# Patient Record
Sex: FEMALE | Race: WHITE | URBAN - METROPOLITAN AREA
[De-identification: names, ages, dates, MRNs, and addresses within clinical notes are randomized per-mention and may not be internally consistent; named-entity substitution may affect disease eponyms.]

---

## 2021-05-28 ENCOUNTER — RECORDS - HEALTHEAST (OUTPATIENT)
Dept: ADMINISTRATIVE | Facility: CLINIC | Age: 46
End: 2021-05-28

## 2021-07-26 DIAGNOSIS — Z11.59 ENCOUNTER FOR SCREENING FOR OTHER VIRAL DISEASES: ICD-10-CM

## 2021-08-05 ENCOUNTER — LAB (OUTPATIENT)
Dept: LAB | Facility: CLINIC | Age: 46
End: 2021-08-05
Attending: OBSTETRICS & GYNECOLOGY
Payer: COMMERCIAL

## 2021-08-05 DIAGNOSIS — Z11.59 ENCOUNTER FOR SCREENING FOR OTHER VIRAL DISEASES: ICD-10-CM

## 2021-08-05 PROCEDURE — U0005 INFEC AGEN DETEC AMPLI PROBE: HCPCS

## 2021-08-05 PROCEDURE — U0003 INFECTIOUS AGENT DETECTION BY NUCLEIC ACID (DNA OR RNA); SEVERE ACUTE RESPIRATORY SYNDROME CORONAVIRUS 2 (SARS-COV-2) (CORONAVIRUS DISEASE [COVID-19]), AMPLIFIED PROBE TECHNIQUE, MAKING USE OF HIGH THROUGHPUT TECHNOLOGIES AS DESCRIBED BY CMS-2020-01-R: HCPCS

## 2021-08-05 RX ORDER — DIAZEPAM 5 MG
5 TABLET ORAL EVERY 6 HOURS
COMMUNITY
Start: 2021-06-01

## 2021-08-05 RX ORDER — LEVOCETIRIZINE DIHYDROCHLORIDE 5 MG/1
5 TABLET, FILM COATED ORAL DAILY
COMMUNITY
Start: 2021-07-27

## 2021-08-05 RX ORDER — LEVOTHYROXINE, LIOTHYRONINE 19; 4.5 UG/1; UG/1
TABLET ORAL
COMMUNITY
Start: 2021-08-01

## 2021-08-05 RX ORDER — LEVOTHYROXINE, LIOTHYRONINE 9.5; 2.25 UG/1; UG/1
TABLET ORAL
COMMUNITY
Start: 2021-07-25

## 2021-08-05 RX ORDER — UBROGEPANT 100 MG/1
TABLET ORAL
COMMUNITY
Start: 2021-07-22

## 2021-08-05 RX ORDER — TRIAMCINOLONE ACETONIDE 1 MG/G
CREAM TOPICAL
COMMUNITY
Start: 2019-11-14

## 2021-08-05 RX ORDER — ALBUTEROL SULFATE 90 UG/1
AEROSOL, METERED RESPIRATORY (INHALATION)
COMMUNITY
Start: 2021-07-21

## 2021-08-05 RX ORDER — BECLOMETHASONE DIPROPIONATE HFA 80 UG/1
AEROSOL, METERED RESPIRATORY (INHALATION)
COMMUNITY
Start: 2021-04-27

## 2021-08-05 ASSESSMENT — MIFFLIN-ST. JEOR: SCORE: 1296.33

## 2021-08-06 LAB — SARS-COV-2 RNA RESP QL NAA+PROBE: NEGATIVE

## 2021-08-09 ENCOUNTER — ANESTHESIA (OUTPATIENT)
Dept: SURGERY | Facility: AMBULATORY SURGERY CENTER | Age: 46
End: 2021-08-09
Payer: COMMERCIAL

## 2021-08-09 ENCOUNTER — HOSPITAL ENCOUNTER (OUTPATIENT)
Facility: AMBULATORY SURGERY CENTER | Age: 46
End: 2021-08-09
Attending: OBSTETRICS & GYNECOLOGY
Payer: COMMERCIAL

## 2021-08-09 ENCOUNTER — ANESTHESIA EVENT (OUTPATIENT)
Dept: SURGERY | Facility: AMBULATORY SURGERY CENTER | Age: 46
End: 2021-08-09
Payer: COMMERCIAL

## 2021-08-09 VITALS
HEART RATE: 111 BPM | HEIGHT: 62 IN | RESPIRATION RATE: 16 BRPM | DIASTOLIC BLOOD PRESSURE: 60 MMHG | TEMPERATURE: 97 F | OXYGEN SATURATION: 95 % | BODY MASS INDEX: 28.52 KG/M2 | WEIGHT: 155 LBS | SYSTOLIC BLOOD PRESSURE: 128 MMHG

## 2021-08-09 DIAGNOSIS — Z85.3 HISTORY OF BREAST CANCER: ICD-10-CM

## 2021-08-09 DIAGNOSIS — Z09 S/P GYNECOLOGICAL SURGERY, FOLLOW-UP EXAM: Primary | ICD-10-CM

## 2021-08-09 DIAGNOSIS — N92.0: ICD-10-CM

## 2021-08-09 LAB
HCG UR QL: NEGATIVE
HEMOGLOBIN: 13 G/DL
HEMOGLOBIN: 14.9 G/DL
INTERNAL QC OK POCT: NORMAL

## 2021-08-09 RX ORDER — PROPOFOL 10 MG/ML
INJECTION, EMULSION INTRAVENOUS CONTINUOUS PRN
Status: DISCONTINUED | OUTPATIENT
Start: 2021-08-09 | End: 2021-08-09

## 2021-08-09 RX ORDER — DEXAMETHASONE SODIUM PHOSPHATE 10 MG/ML
4 INJECTION, SOLUTION INTRAMUSCULAR; INTRAVENOUS ONCE
Status: DISCONTINUED | OUTPATIENT
Start: 2021-08-09 | End: 2021-08-10 | Stop reason: HOSPADM

## 2021-08-09 RX ORDER — DIAZEPAM 5 MG
5 TABLET ORAL 2 TIMES DAILY PRN
Qty: 15 TABLET | Refills: 0 | Status: SHIPPED | OUTPATIENT
Start: 2021-08-09 | End: 2021-08-14

## 2021-08-09 RX ORDER — IBUPROFEN 800 MG/1
800 TABLET, FILM COATED ORAL ONCE
Status: DISCONTINUED | OUTPATIENT
Start: 2021-08-09 | End: 2021-08-10 | Stop reason: HOSPADM

## 2021-08-09 RX ORDER — KETOROLAC TROMETHAMINE 15 MG/ML
15 INJECTION, SOLUTION INTRAMUSCULAR; INTRAVENOUS EVERY 6 HOURS PRN
Status: DISCONTINUED | OUTPATIENT
Start: 2021-08-09 | End: 2021-08-10 | Stop reason: HOSPADM

## 2021-08-09 RX ORDER — SODIUM CHLORIDE, SODIUM LACTATE, POTASSIUM CHLORIDE, AND CALCIUM CHLORIDE .6; .31; .03; .02 G/100ML; G/100ML; G/100ML; G/100ML
IRRIGANT IRRIGATION PRN
Status: DISCONTINUED | OUTPATIENT
Start: 2021-08-09 | End: 2021-08-09 | Stop reason: HOSPADM

## 2021-08-09 RX ORDER — ONDANSETRON 4 MG/1
4 TABLET, ORALLY DISINTEGRATING ORAL EVERY 30 MIN PRN
Status: DISCONTINUED | OUTPATIENT
Start: 2021-08-09 | End: 2021-08-10 | Stop reason: HOSPADM

## 2021-08-09 RX ORDER — FENTANYL CITRATE 50 UG/ML
INJECTION, SOLUTION INTRAMUSCULAR; INTRAVENOUS PRN
Status: DISCONTINUED | OUTPATIENT
Start: 2021-08-09 | End: 2021-08-09

## 2021-08-09 RX ORDER — LIDOCAINE HYDROCHLORIDE 20 MG/ML
INJECTION, SOLUTION INFILTRATION; PERINEURAL PRN
Status: DISCONTINUED | OUTPATIENT
Start: 2021-08-09 | End: 2021-08-09

## 2021-08-09 RX ORDER — GLYCOPYRROLATE 0.2 MG/ML
INJECTION, SOLUTION INTRAMUSCULAR; INTRAVENOUS PRN
Status: DISCONTINUED | OUTPATIENT
Start: 2021-08-09 | End: 2021-08-09

## 2021-08-09 RX ORDER — KETOROLAC TROMETHAMINE 30 MG/ML
INJECTION, SOLUTION INTRAMUSCULAR; INTRAVENOUS PRN
Status: DISCONTINUED | OUTPATIENT
Start: 2021-08-09 | End: 2021-08-09

## 2021-08-09 RX ORDER — FENTANYL CITRATE 50 UG/ML
50 INJECTION, SOLUTION INTRAMUSCULAR; INTRAVENOUS
Status: DISCONTINUED | OUTPATIENT
Start: 2021-08-09 | End: 2021-08-10 | Stop reason: HOSPADM

## 2021-08-09 RX ORDER — MEPERIDINE HYDROCHLORIDE 25 MG/ML
12.5 INJECTION INTRAMUSCULAR; INTRAVENOUS; SUBCUTANEOUS
Status: DISCONTINUED | OUTPATIENT
Start: 2021-08-09 | End: 2021-08-10 | Stop reason: HOSPADM

## 2021-08-09 RX ORDER — CEFAZOLIN SODIUM 2 G/100ML
2 INJECTION, SOLUTION INTRAVENOUS SEE ADMIN INSTRUCTIONS
Status: DISCONTINUED | OUTPATIENT
Start: 2021-08-09 | End: 2021-08-10 | Stop reason: HOSPADM

## 2021-08-09 RX ORDER — ONDANSETRON 2 MG/ML
4 INJECTION INTRAMUSCULAR; INTRAVENOUS EVERY 30 MIN PRN
Status: DISCONTINUED | OUTPATIENT
Start: 2021-08-09 | End: 2021-08-10 | Stop reason: HOSPADM

## 2021-08-09 RX ORDER — ACETAMINOPHEN 325 MG/1
650 TABLET ORAL EVERY 4 HOURS PRN
Status: DISCONTINUED | OUTPATIENT
Start: 2021-08-09 | End: 2021-08-10 | Stop reason: HOSPADM

## 2021-08-09 RX ORDER — SODIUM CHLORIDE, SODIUM LACTATE, POTASSIUM CHLORIDE, CALCIUM CHLORIDE 600; 310; 30; 20 MG/100ML; MG/100ML; MG/100ML; MG/100ML
INJECTION, SOLUTION INTRAVENOUS CONTINUOUS
Status: DISCONTINUED | OUTPATIENT
Start: 2021-08-09 | End: 2021-08-10 | Stop reason: HOSPADM

## 2021-08-09 RX ORDER — MAGNESIUM HYDROXIDE 1200 MG/15ML
LIQUID ORAL PRN
Status: DISCONTINUED | OUTPATIENT
Start: 2021-08-09 | End: 2021-08-09 | Stop reason: HOSPADM

## 2021-08-09 RX ORDER — FENTANYL CITRATE 50 UG/ML
50 INJECTION, SOLUTION INTRAMUSCULAR; INTRAVENOUS EVERY 5 MIN PRN
Status: DISCONTINUED | OUTPATIENT
Start: 2021-08-09 | End: 2021-08-10 | Stop reason: HOSPADM

## 2021-08-09 RX ORDER — MAGNESIUM SULFATE HEPTAHYDRATE 40 MG/ML
4 INJECTION, SOLUTION INTRAVENOUS ONCE
Status: COMPLETED | OUTPATIENT
Start: 2021-08-09 | End: 2021-08-09

## 2021-08-09 RX ORDER — PROPOFOL 10 MG/ML
INJECTION, EMULSION INTRAVENOUS PRN
Status: DISCONTINUED | OUTPATIENT
Start: 2021-08-09 | End: 2021-08-09

## 2021-08-09 RX ORDER — HALOPERIDOL 5 MG/ML
1 INJECTION INTRAMUSCULAR ONCE
Status: COMPLETED | OUTPATIENT
Start: 2021-08-09 | End: 2021-08-09

## 2021-08-09 RX ORDER — CEFAZOLIN SODIUM 2 G/100ML
2 INJECTION, SOLUTION INTRAVENOUS
Status: COMPLETED | OUTPATIENT
Start: 2021-08-09 | End: 2021-08-09

## 2021-08-09 RX ORDER — KETAMINE HYDROCHLORIDE 10 MG/ML
INJECTION INTRAMUSCULAR; INTRAVENOUS PRN
Status: DISCONTINUED | OUTPATIENT
Start: 2021-08-09 | End: 2021-08-09

## 2021-08-09 RX ORDER — LIDOCAINE 40 MG/G
CREAM TOPICAL
Status: DISCONTINUED | OUTPATIENT
Start: 2021-08-09 | End: 2021-08-10 | Stop reason: HOSPADM

## 2021-08-09 RX ORDER — HYDROMORPHONE HCL IN WATER/PF 6 MG/30 ML
0.4 PATIENT CONTROLLED ANALGESIA SYRINGE INTRAVENOUS EVERY 5 MIN PRN
Status: SHIPPED | OUTPATIENT
Start: 2021-08-09 | End: 2021-08-09

## 2021-08-09 RX ORDER — TRAMADOL HYDROCHLORIDE 50 MG/1
50 TABLET ORAL EVERY 6 HOURS PRN
Qty: 20 TABLET | Refills: 0 | Status: SHIPPED | OUTPATIENT
Start: 2021-08-09 | End: 2021-08-16

## 2021-08-09 RX ORDER — ALBUTEROL SULFATE 0.83 MG/ML
2.5 SOLUTION RESPIRATORY (INHALATION) EVERY 4 HOURS PRN
Status: DISCONTINUED | OUTPATIENT
Start: 2021-08-09 | End: 2021-08-10 | Stop reason: HOSPADM

## 2021-08-09 RX ORDER — LIDOCAINE HYDROCHLORIDE 10 MG/ML
INJECTION, SOLUTION EPIDURAL; INFILTRATION; INTRACAUDAL; PERINEURAL PRN
Status: DISCONTINUED | OUTPATIENT
Start: 2021-08-09 | End: 2021-08-09 | Stop reason: HOSPADM

## 2021-08-09 RX ORDER — OXYCODONE HYDROCHLORIDE 5 MG/1
5 TABLET ORAL EVERY 4 HOURS PRN
Status: DISCONTINUED | OUTPATIENT
Start: 2021-08-09 | End: 2021-08-10 | Stop reason: HOSPADM

## 2021-08-09 RX ORDER — DEXAMETHASONE SODIUM PHOSPHATE 10 MG/ML
INJECTION, SOLUTION INTRAMUSCULAR; INTRAVENOUS PRN
Status: DISCONTINUED | OUTPATIENT
Start: 2021-08-09 | End: 2021-08-09

## 2021-08-09 RX ADMIN — DEXAMETHASONE SODIUM PHOSPHATE 10 MG: 10 INJECTION, SOLUTION INTRAMUSCULAR; INTRAVENOUS at 12:13

## 2021-08-09 RX ADMIN — SODIUM CHLORIDE, SODIUM LACTATE, POTASSIUM CHLORIDE, CALCIUM CHLORIDE: 600; 310; 30; 20 INJECTION, SOLUTION INTRAVENOUS at 14:22

## 2021-08-09 RX ADMIN — HALOPERIDOL 1 MG: 5 INJECTION INTRAMUSCULAR at 16:24

## 2021-08-09 RX ADMIN — FENTANYL CITRATE 50 MCG: 50 INJECTION, SOLUTION INTRAMUSCULAR; INTRAVENOUS at 12:30

## 2021-08-09 RX ADMIN — PROPOFOL 50 MG: 10 INJECTION, EMULSION INTRAVENOUS at 14:21

## 2021-08-09 RX ADMIN — CEFAZOLIN SODIUM 2 G: 2 INJECTION, SOLUTION INTRAVENOUS at 12:23

## 2021-08-09 RX ADMIN — GLYCOPYRROLATE 0.2 MG: 0.2 INJECTION, SOLUTION INTRAMUSCULAR; INTRAVENOUS at 12:35

## 2021-08-09 RX ADMIN — PROPOFOL 150 MCG/KG/MIN: 10 INJECTION, EMULSION INTRAVENOUS at 12:17

## 2021-08-09 RX ADMIN — ACETAMINOPHEN 650 MG: 325 TABLET ORAL at 17:13

## 2021-08-09 RX ADMIN — FENTANYL CITRATE 50 MCG: 50 INJECTION, SOLUTION INTRAMUSCULAR; INTRAVENOUS at 13:20

## 2021-08-09 RX ADMIN — PROPOFOL 100 MG: 10 INJECTION, EMULSION INTRAVENOUS at 14:15

## 2021-08-09 RX ADMIN — LIDOCAINE HYDROCHLORIDE 60 ML: 20 INJECTION, SOLUTION INFILTRATION; PERINEURAL at 12:13

## 2021-08-09 RX ADMIN — KETAMINE HYDROCHLORIDE 50 MG: 10 INJECTION INTRAMUSCULAR; INTRAVENOUS at 12:13

## 2021-08-09 RX ADMIN — KETOROLAC TROMETHAMINE 15 MG: 30 INJECTION, SOLUTION INTRAMUSCULAR; INTRAVENOUS at 14:19

## 2021-08-09 RX ADMIN — SODIUM CHLORIDE, SODIUM LACTATE, POTASSIUM CHLORIDE, CALCIUM CHLORIDE: 600; 310; 30; 20 INJECTION, SOLUTION INTRAVENOUS at 11:21

## 2021-08-09 RX ADMIN — MAGNESIUM SULFATE HEPTAHYDRATE 4 G: 40 INJECTION, SOLUTION INTRAVENOUS at 11:21

## 2021-08-09 RX ADMIN — FENTANYL CITRATE 50 MCG: 50 INJECTION, SOLUTION INTRAMUSCULAR; INTRAVENOUS at 13:30

## 2021-08-09 RX ADMIN — FENTANYL CITRATE 50 MCG: 50 INJECTION, SOLUTION INTRAMUSCULAR; INTRAVENOUS at 12:13

## 2021-08-09 RX ADMIN — Medication 50 MG: at 12:13

## 2021-08-09 NOTE — ANESTHESIA PREPROCEDURE EVALUATION
Anesthesia Pre-Procedure Evaluation    Patient: Karen Garcia   MRN: [id number] : 1975        Preoperative Diagnosis: History of breast cancer [Z85.3]  Excess, menstruation [N92.0]   Procedure : Procedure(s):  TOTAL LAPAROSCOPIC HYSTERECTOMY-POSSIBLE BILATERAL SALPINGO-OOPHERECTOMY     Past Medical History:   Diagnosis Date     Motion sickness      Other chronic pain      PONV (postoperative nausea and vomiting)      Thyroid disease      Uncomplicated asthma       Past Surgical History:   Procedure Laterality Date     BREAST SURGERY       GENITOURINARY SURGERY       GYN SURGERY        Allergies   Allergen Reactions     Codeine Other (See Comments) and Swelling     Other reaction(s): Angioedema  Thick tongue.     Hydrocodone-Acetaminophen      Other reaction(s): Tongue Swelling  Vicodin     Nitrofurantoin Hives     rash  Per Pt. On 2015       Scopolamine Nausea and Vomiting     Worsens nausea vomitting       Sulfa Drugs Anaphylaxis     Acetaminophen Other (See Comments)     Thick tounge     Hydrocodone Other (See Comments)     Thick tongue     Hydromorphone GI Disturbance and Nausea and Vomiting     Morphine GI Disturbance and Nausea and Vomiting     Ondansetron Other (See Comments) and Nausea and Vomiting     nausea        Social History     Tobacco Use     Smoking status: Never Smoker     Smokeless tobacco: Never Used   Substance Use Topics     Alcohol use: Yes     Comment: occassional       Wt Readings from Last 1 Encounters:   21 70.3 kg (155 lb)        Anesthesia Evaluation   Pt has had prior anesthetic.     History of anesthetic complications       ROS/MED HX  ENT/Pulmonary:     (+) asthma     Neurologic:       Cardiovascular:       METS/Exercise Tolerance:     Hematologic:       Musculoskeletal:       GI/Hepatic:       Renal/Genitourinary:       Endo:     (+) thyroid problem,     Psychiatric/Substance Use:       Infectious Disease:       Malignancy:       Other:            Physical  Exam    Airway        Mallampati: II    Neck ROM: full     Respiratory Devices and Support         Dental  no notable dental history         Cardiovascular   cardiovascular exam normal          Pulmonary   pulmonary exam normal                OUTSIDE LABS:  CBC:   Lab Results   Component Value Date    HGB 14.9 08/09/2021     BMP: No results found for: NA, POTASSIUM, CHLORIDE, CO2, BUN, CR, GLC  COAGS: No results found for: PTT, INR, FIBR  POC: No results found for: BGM, HCG, HCGS  HEPATIC: No results found for: ALBUMIN, PROTTOTAL, ALT, AST, GGT, ALKPHOS, BILITOTAL, BILIDIRECT, LILLIAN  OTHER: No results found for: PH, LACT, A1C, TANISHA, PHOS, MAG, LIPASE, AMYLASE, TSH, T4, T3, CRP, SED    Anesthesia Plan    ASA Status:  2      Anesthesia Type: General.     - Airway: ETT              Consents    Anesthesia Plan(s) and associated risks, benefits, and realistic alternatives discussed. Questions answered and patient/representative(s) expressed understanding.     - Discussed with:  Patient         Postoperative Care            Comments:                Miracle Barnes MD

## 2021-08-09 NOTE — H&P
"Karen Garcia is an 46 year old female s/p TLH/BSO at the [facility] today with Dr. Ramirez.   Preop hemoglobin was 14.9.  Post operative hemoglobin is 13.  I was called to see the patient by the post operative nurses secondary to a vaginal clot.   On my arrival they showed me a saturated pad with a large clot.    I performed a speculum exam to visualize the cuff.  There was no active bleeding.   Monzels was placed on the cuff.      Past Medical History:   Diagnosis Date     Motion sickness      Other chronic pain      PONV (postoperative nausea and vomiting)      Thyroid disease      Uncomplicated asthma        Allergies:   Allergies   Allergen Reactions     Codeine Other (See Comments) and Swelling     Other reaction(s): Angioedema  Thick tongue.     Hydrocodone-Acetaminophen      Other reaction(s): Tongue Swelling  Vicodin     Nitrofurantoin Hives     rash  Per Pt. On 6-       Scopolamine Nausea and Vomiting     Worsens nausea vomitting       Sulfa Drugs Anaphylaxis     Hydrocodone Other (See Comments)     Thick tongue     Hydromorphone GI Disturbance and Nausea and Vomiting     Morphine GI Disturbance and Nausea and Vomiting     Ondansetron Other (See Comments) and Nausea and Vomiting     nausea         Active Problems:    * No active hospital problems. *    Blood pressure 120/64, pulse 97, temperature 97  F (36.1  C), temperature source Temporal, resp. rate 16, height 1.575 m (5' 2\"), weight 70.3 kg (155 lb), last menstrual period 08/04/2021, SpO2 96 %.    Review of Systems    Physical Exam   I performed a speculum exam to visualize the cuff.  There was no active bleeding.   Monzels was placed on the cuff.        Assessment and Plan:  Patient with postoperative vaginal cuff bleeding.   No active bleed visualized.  She is hemodynamically stable.    Plan 23 hour observation in the hospital to ensure no continued bleeding.   Patient understands the potential for return to the operating " room to add sutures to the vaginal cuff.    Brigido Ramirez MD  8/9/2021

## 2021-08-09 NOTE — ANESTHESIA CARE TRANSFER NOTE
Patient: Karen Garcia    Procedure(s):  TOTAL LAPAROSCOPIC HYSTERECTOMY BILATERAL SALPINGO-OOPHERECTOMY, UTERAL SACRAL SUSPENSION AND CYSTOSCOPY    Diagnosis: History of breast cancer [Z85.3]  Excess, menstruation [N92.0]  Diagnosis Additional Information: No value filed.    Anesthesia Type:   General     Note:    Oropharynx: oropharynx clear of all foreign objects  Level of Consciousness: drowsy  Oxygen Supplementation: face mask  Level of Supplemental Oxygen (L/min / FiO2): 8  Independent Airway: airway patency satisfactory and stable  Dentition: dentition unchanged  Vital Signs Stable: post-procedure vital signs reviewed and stable  Report to RN Given: handoff report given  Patient transferred to: PACU    Handoff Report: Identifed the Patient, Identified the Reponsible Provider, Reviewed the pertinent medical history, Discussed the surgical course, Reviewed Intra-OP anesthesia mangement and issues during anesthesia, Set expectations for post-procedure period and Allowed opportunity for questions and acknowledgement of understanding      Vitals:  Vitals Value Taken Time   /65 08/09/21 1443   Temp 36.1  C (97  F) 08/09/21 1443   Pulse 111 08/09/21 1443   Resp 16 08/09/21 1443   SpO2 98 % 08/09/21 1443       Electronically Signed By: BRANDY Frey CRNA  August 9, 2021  2:45 PM

## 2021-08-09 NOTE — ANESTHESIA PROCEDURE NOTES
Airway       Patient location during procedure: OR       Procedure Start/Stop Times: 8/9/2021 12:20 PM  Staff -        CRNA: Chanel Mcintosh APRN CRNA       Performed By: CRNA  Consent for Airway        Urgency: elective  Indications and Patient Condition       Indications for airway management: brielle-procedural       Induction type:intravenous       Mask difficulty assessment: 1 - vent by mask    Final Airway Details       Final airway type: endotracheal airway       Successful airway: ETT - single  Endotracheal Airway Details        ETT size (mm): 7.0       Cuffed: yes       Successful intubation technique: video laryngoscopy       VL Blade Size: Glidescope 3       Grade View of Cords: 2       Adjucts: tooth guard and stylet       Position: Center       Measured from: gums/teeth       Secured at (cm): 24       Bite block used: Soft    Post intubation assessment        Placement verified by: capnometry, equal breath sounds and chest rise        Number of attempts at approach: 2       Number of other approaches attempted: 1       Secured with: silk tape       Ease of procedure: easy       Dentition: Intact and Unchanged    Additional Comments       Easy to mask ventilate, DL x 1 with Valentine 2-able to visualize vocal cords-grade 2 view but unable to pass ett through vocal cords-suctioned oropharynx due to increased secretions. Glidescope 3 blade used to visualize vocal cords and ett placed through open and clear cords.  + etc02 and bilateral breath sounds.  VSS.

## 2021-08-09 NOTE — DISCHARGE INSTRUCTIONS
You received a medication called Toradol at 2:19 PM.  This is an NSAID like ibuprofen, so no further NSAIDs (ibuprofen, advil, naproxen, aleve, etc) until 8:15 PM

## 2021-08-09 NOTE — PROGRESS NOTES
Dr. Ramirez at bedside, 2 RNs assisted with vaginal exam.  See Dr. Ramirez's note.  Pt tolerated procedure.  After MD discussion with pt and , elected to transfer pt to hospital for observation overnight because of vaginal bleeding, lethargy, inability to tolerate PO intake.  Dr. Ramirez calling to arrange for admission.

## 2021-08-09 NOTE — ANESTHESIA POSTPROCEDURE EVALUATION
Patient: Karen Garcia    Procedure(s):  TOTAL LAPAROSCOPIC HYSTERECTOMY BILATERAL SALPINGO-OOPHERECTOMY, UTERAL SACRAL SUSPENSION AND CYSTOSCOPY    Diagnosis:History of breast cancer [Z85.3]  Excess, menstruation [N92.0]  Diagnosis Additional Information: No value filed.    Anesthesia Type:  General    Note:  Disposition: Outpatient   Postop Pain Control: Uneventful            Sign Out: Well controlled pain   PONV: No   Neuro/Psych: Uneventful            Sign Out: Acceptable/Baseline neuro status   Airway/Respiratory: Uneventful            Sign Out: Acceptable/Baseline resp. status   CV/Hemodynamics: Uneventful            Sign Out: Acceptable CV status; No obvious hypovolemia; No obvious fluid overload   Other NRE: NONE   DID A NON-ROUTINE EVENT OCCUR? No           Last vitals:  Vitals Value Taken Time   /58 08/09/21 1530   Temp 97  F (36.1  C) 08/09/21 1443   Pulse 97 08/09/21 1531   Resp 16 08/09/21 1530   SpO2 92 % 08/09/21 1531   Vitals shown include unvalidated device data.    Electronically Signed By: MARTINE MONSIVAIS MD  August 9, 2021  3:52 PM

## 2021-08-09 NOTE — PROGRESS NOTES
"Pt nauseated after taking tylenol, retching and small amount of emesis.  With coughing and retching pt said she felt like she was \"gushing\" blood.  Checked brielle pad and found second very large clot with saturated pad.  Cleaned and changed pt.  Awaiting arrival of Dr. Ramirez.  "

## 2021-08-09 NOTE — PROGRESS NOTES
Pt  c/o 5/10 pain.  Nausea is improving after haldol, but pt still declines eating.  Discussed with pt pain relief options as tramadol on an empty stomach could increase her nausea again.  Pt agrees to tylenol.  Acetaminophen was listed as an allergy, but she states that she can take it without issues, it was the hydrocodone and codeine that caused problems.  Order received from Dr. Ct BAUM.

## 2021-08-09 NOTE — PROGRESS NOTES
Pt passed moderate, half dollar sized clot after coming out of surgery.  Pad and panties changed, vitals remained stable, complains of cramping but declines pain meds.  At next VS check noted second clot visible at the labia.  VS remain stable.  After transfer to phase 2 pt continues to c/o cramping and wanting the Cruz out.  Cruz d/c'd per protocol.  Clot still visible at the labia.  Walked pt into the bathroom where she passed a large clot, about the size of 2 golf balls.  After clot passed pt continued dripping blood from her vagina.  She was nauseated and retching.  No emesis.  Walked pt back to bed.  Discussed with Dr. Aria BAUM and gave haldol for nausea.  Placed a call to Dr. Ramirez.  He returned call and will come and check on pt.  Hemoglobin done per orders and was 13.  This was reported to Dr. Ramirez as well.  VS remain stable, pt resting in bed awaiting Dr. Ramirez.  Will continue to monitor closely.

## 2021-08-10 NOTE — OP NOTE
Procedure Date: 08/09/2021    Karen Garcia is here today for a hysterectomy.    PREOPERATIVE DIAGNOSES:   1.  History of abnormal uterine bleeding.   2.  Pelvic pain.  3.  Increased genetic risk for breast cancer.  4.  Vaginal relaxation.    POSTOPERATIVE DIAGNOSIS:   1.  History of abnormal uterine bleeding.   2.  Pelvic pain.  3.  Increased genetic risk for breast cancer.  4.  Vaginal relaxation.    PROCEDURES:   1.  Total laparoscopic hysterectomy, bilateral salpingo-oophorectomy.   2.  Uterosacral ligament suspension.  3.  Cystoscopy.    SURGEON:  Brigido Ramirez MD    FINDINGS:  Slightly enlarged uterus.  There was a cyst on her left ovary, stage II apical vaginal relaxation.    INDICATIONS:  The patient has progressively worsening pain and bleeding and a recent history of increased genetic risk of breast cancer, desires hysterectomy, bilateral salpingo-oophorectomy.    DESCRIPTION OF PROCEDURE:  After assuring informed consent, the patient was taken to the operating room.  She was prepped and draped in the usual sterile manner.  She was placed in dorsal lithotomy position, feet in stirrups.  The patient was positioned by me, ensuring there is no excess flexion of the hips or the knees.  At this point, uterine manipulator was inserted and attention was directed to the umbilicus where a Veress needle inserted.  Pneumoperitoneum was achieved.  Infraumbilical incision made and a 5 mm trocar sleeve inserted.  A direct laparoscopic confirmation of location was achieved.  There was no bleeding or damage noted.  At this point, a second trocar was placed in the left McBurney's point  .  Both were placed under direct laparoscopic visualization, ensuring there was no injury or damage to the pelvis.  At this point, the right IP ligament was cauterized and cut.  Broad ligament tissues cauterized and cut.  The round ligament was divided.  The vesicouterine peritoneum was cut to the midline.  On the contralateral side,  the patient had scarring of the pelvis to the pelvic sidewall. The IP ligament was cauterized and cut.  The broad ligament tissues cauterized and cut.  Broad ligament was divided.  Bladder flap was created.  At this point, the anterior colpotomy was performed.The uterus was then removed and pulled into the vagina.  At this point, the vaginal cuff was closed with figure-of-eight sutures.  Uterosacral ligament was then unavoidably cut in this surgery secondary to position and the uterosacral ligament was grasped, suture and tied to the vagina bilaterally.  Surgicel was placed in the vagina prophylactically.  The patient was noted to be hemostatic.  Lidocaine was placed in the abdomen.  Incisions were closed.  The gas allowed to escape from the abdomen.  This concluded the procedure.  Upon completion of procedure, the blood clot was noted in the patient's vagina.  On evacuation and visualization, there was no sign of active bleeding; however, Surgicel was placed on the vaginal cuff on the vaginal side as well.     Instrument, sponge and needle counts correct x2.    ESTIMATED BLOOD LOSS:  150 mL    COMPLICATIONS:  None.    PATHOLOGY:  Uterus, ovaries and fallopian tubes.    DRAINS:  Cruz to gravity.    DISPOSITION:  The patient was taken to recovery room in stable condition.  It should be noted procedure and cystoscopic findings, there was no bladder damage noted.  There was bilateral ureteral efflux was noted from both the ureters.     Brigido Ramirez MD        D: 2021   T: 2021   MT: paige/pse    Name:     MAIA FOSS  MRN:      [id number]        Account:        [id number]   :      1975           Procedure Date: 2021     Document: [id number]

## 2021-08-10 NOTE — PROGRESS NOTES
Unable to locate hospital bed within [facility] for pt, so per pt request, Dr. Ramirez arranged for admission to [facility], room 243.    Assisted Dr. Ramirez with second bedside vaginal exam.  No further bleeding noted.  Pt remains unable to tolerate much PO intake, taking water only, continues with intermittent nausea.     Report called to [facility], spoke with KRISH Bird at [phone].  Pt was dressed and IV saline locked. OK with Dr. Ramirez for pt's  to transport her via private vehicle to [place].   Pt stable at discharge. Dr. Ramirez to follow pt at [place],

## 2023-05-14 ENCOUNTER — HEALTH MAINTENANCE LETTER (OUTPATIENT)
Age: 48
End: 2023-05-14

## 2024-06-06 ENCOUNTER — TRANSFERRED RECORDS (OUTPATIENT)
Dept: HEALTH INFORMATION MANAGEMENT | Facility: CLINIC | Age: 49
End: 2024-06-06
Payer: COMMERCIAL

## 2024-07-21 ENCOUNTER — HEALTH MAINTENANCE LETTER (OUTPATIENT)
Age: 49
End: 2024-07-21

## 2024-07-24 ENCOUNTER — TRANSFERRED RECORDS (OUTPATIENT)
Dept: HEALTH INFORMATION MANAGEMENT | Facility: CLINIC | Age: 49
End: 2024-07-24
Payer: COMMERCIAL

## 2025-01-24 ENCOUNTER — MEDICAL CORRESPONDENCE (OUTPATIENT)
Dept: HEALTH INFORMATION MANAGEMENT | Facility: CLINIC | Age: 50
End: 2025-01-24
Payer: COMMERCIAL